# Patient Record
Sex: MALE | Race: ASIAN | NOT HISPANIC OR LATINO
[De-identification: names, ages, dates, MRNs, and addresses within clinical notes are randomized per-mention and may not be internally consistent; named-entity substitution may affect disease eponyms.]

---

## 2018-03-09 ENCOUNTER — NON-APPOINTMENT (OUTPATIENT)
Age: 63
End: 2018-03-09

## 2018-03-09 ENCOUNTER — APPOINTMENT (OUTPATIENT)
Dept: CARDIOLOGY | Facility: CLINIC | Age: 63
End: 2018-03-09
Payer: MEDICAID

## 2018-03-09 VITALS
DIASTOLIC BLOOD PRESSURE: 115 MMHG | TEMPERATURE: 97.9 F | RESPIRATION RATE: 18 BRPM | SYSTOLIC BLOOD PRESSURE: 154 MMHG | BODY MASS INDEX: 22.68 KG/M2 | HEART RATE: 79 BPM | WEIGHT: 128 LBS | OXYGEN SATURATION: 99 %

## 2018-03-09 PROCEDURE — 99214 OFFICE O/P EST MOD 30 MIN: CPT

## 2018-03-09 PROCEDURE — 93000 ELECTROCARDIOGRAM COMPLETE: CPT

## 2018-03-15 ENCOUNTER — APPOINTMENT (OUTPATIENT)
Dept: CARDIOLOGY | Facility: CLINIC | Age: 63
End: 2018-03-15
Payer: MEDICAID

## 2018-03-15 PROCEDURE — 93306 TTE W/DOPPLER COMPLETE: CPT

## 2018-05-20 RX ORDER — ATENOLOL 50 MG/1
50 TABLET ORAL
Refills: 0 | Status: ACTIVE | COMMUNITY
Start: 2017-10-24

## 2018-10-24 ENCOUNTER — APPOINTMENT (OUTPATIENT)
Dept: CARDIOLOGY | Facility: CLINIC | Age: 63
End: 2018-10-24
Payer: MEDICAID

## 2018-10-24 ENCOUNTER — NON-APPOINTMENT (OUTPATIENT)
Age: 63
End: 2018-10-24

## 2018-10-24 VITALS
RESPIRATION RATE: 18 BRPM | WEIGHT: 130 LBS | HEART RATE: 66 BPM | TEMPERATURE: 98.2 F | DIASTOLIC BLOOD PRESSURE: 90 MMHG | SYSTOLIC BLOOD PRESSURE: 144 MMHG | OXYGEN SATURATION: 99 % | BODY MASS INDEX: 23.04 KG/M2

## 2018-10-24 PROCEDURE — 99214 OFFICE O/P EST MOD 30 MIN: CPT

## 2018-10-24 RX ORDER — WARFARIN 4 MG/1
4 TABLET ORAL
Qty: 30 | Refills: 0 | Status: DISCONTINUED | COMMUNITY
Start: 2018-10-22 | End: 2018-10-24

## 2018-10-24 RX ORDER — LOSARTAN POTASSIUM 100 MG/1
100 TABLET, FILM COATED ORAL
Qty: 30 | Refills: 0 | Status: ACTIVE | COMMUNITY
Start: 2018-07-25

## 2018-10-29 RX ORDER — LOSARTAN POTASSIUM 25 MG/1
25 TABLET, FILM COATED ORAL
Qty: 30 | Refills: 0 | Status: DISCONTINUED | COMMUNITY
Start: 2017-10-24 | End: 2018-10-29

## 2019-02-22 ENCOUNTER — APPOINTMENT (OUTPATIENT)
Dept: CARDIOLOGY | Facility: CLINIC | Age: 64
End: 2019-02-22
Payer: MEDICAID

## 2019-02-22 VITALS
OXYGEN SATURATION: 97 % | DIASTOLIC BLOOD PRESSURE: 87 MMHG | BODY MASS INDEX: 22.86 KG/M2 | WEIGHT: 129 LBS | SYSTOLIC BLOOD PRESSURE: 130 MMHG | HEART RATE: 63 BPM | TEMPERATURE: 98 F | RESPIRATION RATE: 17 BRPM

## 2019-02-22 DIAGNOSIS — I74.2 EMBOLISM AND THROMBOSIS OF ARTERIES OF THE UPPER EXTREMITIES: ICD-10-CM

## 2019-02-22 PROCEDURE — 99214 OFFICE O/P EST MOD 30 MIN: CPT

## 2019-02-22 RX ORDER — RIVAROXABAN 20 MG/1
20 TABLET, FILM COATED ORAL DAILY
Qty: 30 | Refills: 5 | Status: ACTIVE | COMMUNITY
Start: 2018-10-24 | End: 1900-01-01

## 2019-02-22 NOTE — DISCUSSION/SUMMARY
[FreeTextEntry1] : 63-year-old male smoker with AFIB and HTN presents for followup.  Patient was last seen on 3/9/18.  Patient underwent an echocardiogram and it showed normal LV function without significant valvular pathology.  Patient reports that on 9/29/18 while in [place] he developed arm pain and discoloration.  He was found to have a clot requiring embolectomy.  Patient reports residual numbness along the ulnar nerve distribution.  He was advised to continue ASA.  His PCP started him on Warfarin.  Patient has been stable.  Patient denies CP, SOB, palpitations, or lightheadedness.  He is on Atenolol 50 mg, Diltiazem 120 mg, and Losartan 100 mg for rate control and HTN.\par \par (1) Atrial fibrillation, with emboli to arm - I advised patient to switch Warfarin to Xarelto 20 mg.  He may stop ASA.   His heart rate is well controlled.  I advised patient to continue Diltiazem and Atenolol.  \par \par (2) HTN - His BP was borderline elevated.  HCTZ can be added to Losartan 100 mg if his BP remains elevated.  \par \par (3) Followup - 1 month.

## 2019-02-22 NOTE — HISTORY OF PRESENT ILLNESS
[FreeTextEntry1] : Patient denies CP, SOB, palpitations, or lightheadedness. Patient had stopped taking Aspirin. Denies any bleeding issues. Continuing with Diltiazem and Atenolol. FU in 6 months and I advised patient to undergo an echocardiogram then. \par \par 63-year-old male smoker with AFIB, emboli to arm, and HTN presents for followup.  Patient was last seen on 10/24/18.   I advised patient to switch Warfarin to Xarelto 20 mg and stop ASA.   He is on Diltiazem  mg and Atenolol 50 mg for rate control.  He is on Losartan 100 mg for HTN.\par \par (1) Atrial fibrillation, with emboli to arm - I advised patient to switch Warfarin to Xarelto 20 mg.  He may stop ASA.   His heart rate is well controlled.  I advised patient to continue Diltiazem and Atenolol.  \par \par (2) HTN - His BP was borderline elevated.  HCTZ can be added to Losartan 100 mg if his BP remains elevated.  \par \par (3) Followup - 1 month.\par \par 3/9/18.  Patient underwent an echocardiogram and it showed normal LV function without significant valvular pathology.  Patient reports that on 9/29/18 while in [place] he developed arm pain and discoloration.  He was found to have a clot requiring embolectomy.  Patient reports residual numbness along the ulnar nerve distribution.  He was advised to continue ASA.  His PCP started him on Warfarin.  Patient has been stable.  Patient denies CP, SOB, palpitations, or lightheadedness.  He is on Atenolol 50 mg, Diltiazem 120 mg, and Losartan 100 mg for rate control and HTN.\par \par \par \par \par (1) Atrial fibrillation, persistent - Patient is asymptomatic.  Patient is not interested in cardioversion at this time (pharmacologic or electrical).  His [id number] score is 1 (HTN).  He should continue ASA 81 mg for stroke prevention.  He will switch to NOAC when he turns 67.  His heart rate is well controlled.  I advised patient to continue Diltiazem and Atenolol.  Patient underwent an echocardiogram and it showed normal LV function without significant valvular pathology. \par \par (2) HTN - His BP was elevated.  His PCP already increased Atenolol to 50 mg.  He should continue Losartan 25 mg.   Losartan can be increased to 50 mg if his BP remains elevated.\par \par (3) Followup - 6 months.\par \par 1/8/16 for evaluation of atrial fibrillation.  Patient underwent an echocardiogram and it showed normal LV function without significant valvular pathology.   He was advised to start Diltiazem  mg for rate control and ASA 81 mg for stroke prevention.  Since then he has been started on Atenolol 25 mg for additional rate control and Losartan 25 mg for HTN.  Patient has been stable.  Patient denies CP, SOB, palpitations, or lightheadedness.  His BP was elevated today so his PCP advised him to increase Atenolol to 50 mg.\par

## 2019-02-22 NOTE — PHYSICAL EXAM
[General Appearance - Well Developed] : well developed [Normal Appearance] : normal appearance [Well Groomed] : well groomed [General Appearance - Well Nourished] : well nourished [No Deformities] : no deformities [General Appearance - In No Acute Distress] : no acute distress [Normal Conjunctiva] : the conjunctiva exhibited no abnormalities [Eyelids - No Xanthelasma] : the eyelids demonstrated no xanthelasmas [Normal Oral Mucosa] : normal oral mucosa [No Oral Pallor] : no oral pallor [No Oral Cyanosis] : no oral cyanosis [Normal Jugular Venous A Waves Present] : normal jugular venous A waves present [Normal Jugular Venous V Waves Present] : normal jugular venous V waves present [No Jugular Venous Yen A Waves] : no jugular venous yen A waves [Respiration, Rhythm And Depth] : normal respiratory rhythm and effort [Exaggerated Use Of Accessory Muscles For Inspiration] : no accessory muscle use [Heart Sounds] : normal S1 and S2 [Murmurs] : no murmurs present [Arterial Pulses Normal] : the arterial pulses were normal [Edema] : no peripheral edema present [Irregularly Irregular] : the rhythm was irregularly irregular [Abdomen Soft] : soft [Abdomen Tenderness] : non-tender [Abdomen Mass (___ Cm)] : no abdominal mass palpated [Abnormal Walk] : normal gait [Gait - Sufficient For Exercise Testing] : the gait was sufficient for exercise testing [Nail Clubbing] : no clubbing of the fingernails [Cyanosis, Localized] : no localized cyanosis [Petechial Hemorrhages (___cm)] : no petechial hemorrhages [] : no ischemic changes [Oriented To Time, Place, And Person] : oriented to person, place, and time [Affect] : the affect was normal [Mood] : the mood was normal [No Anxiety] : not feeling anxious [FreeTextEntry1] : wheezing noted on left

## 2021-04-16 ENCOUNTER — NON-APPOINTMENT (OUTPATIENT)
Age: 66
End: 2021-04-16

## 2021-04-16 ENCOUNTER — APPOINTMENT (OUTPATIENT)
Dept: CARDIOLOGY | Facility: CLINIC | Age: 66
End: 2021-04-16
Payer: MEDICAID

## 2021-04-16 VITALS
SYSTOLIC BLOOD PRESSURE: 130 MMHG | HEART RATE: 81 BPM | TEMPERATURE: 96 F | DIASTOLIC BLOOD PRESSURE: 82 MMHG | BODY MASS INDEX: 21.8 KG/M2 | OXYGEN SATURATION: 97 % | RESPIRATION RATE: 18 BRPM | WEIGHT: 123 LBS

## 2021-04-16 PROCEDURE — 93000 ELECTROCARDIOGRAM COMPLETE: CPT

## 2021-04-16 PROCEDURE — 99214 OFFICE O/P EST MOD 30 MIN: CPT | Mod: 25

## 2021-04-16 PROCEDURE — 99072 ADDL SUPL MATRL&STAF TM PHE: CPT

## 2021-04-16 PROCEDURE — 93306 TTE W/DOPPLER COMPLETE: CPT

## 2021-07-20 NOTE — HISTORY OF PRESENT ILLNESS
[FreeTextEntry1] : 66-year-old male smoker with AFIB, emboli to arm, and HTN presents for followup.  \par \par Patient was last seen on 10/24/18.   I advised patient to switch Warfarin to Xarelto 20 mg and stop ASA.   He is on Diltiazem  mg and Atenolol 50 mg for rate control.  He is on Losartan 100 mg for HTN.\par \par Last echo was on 3/15/18 and it showed normal LV function without significant valvular pathology.

## 2021-07-20 NOTE — REASON FOR VISIT
[Follow-Up - Clinic] : a clinic follow-up of [Atrial Fibrillation] : atrial fibrillation [FreeTextEntry1] : 4/16/21 - Patient denies CP, SOB, palpitations, or lightheadedness. He is feeling well. I advised patient to undergo an echocardiogram. Fu in one year. \par \par 2/22/19 - Patient denies CP, SOB, palpitations, or lightheadedness. Patient had stopped taking Aspirin. Denies any bleeding issues. Continuing with Diltiazem and Atenolol. FU in 6 months and I advised patient to undergo an echocardiogram then.

## 2022-07-20 ENCOUNTER — APPOINTMENT (OUTPATIENT)
Dept: CARDIOLOGY | Facility: CLINIC | Age: 67
End: 2022-07-20

## 2022-07-20 ENCOUNTER — NON-APPOINTMENT (OUTPATIENT)
Age: 67
End: 2022-07-20

## 2022-07-20 VITALS
HEART RATE: 106 BPM | RESPIRATION RATE: 17 BRPM | SYSTOLIC BLOOD PRESSURE: 145 MMHG | OXYGEN SATURATION: 98 % | DIASTOLIC BLOOD PRESSURE: 97 MMHG | BODY MASS INDEX: 24.46 KG/M2 | TEMPERATURE: 97.3 F | WEIGHT: 138 LBS

## 2022-07-20 DIAGNOSIS — I10 ESSENTIAL (PRIMARY) HYPERTENSION: ICD-10-CM

## 2022-07-20 DIAGNOSIS — R00.2 PALPITATIONS: ICD-10-CM

## 2022-07-20 DIAGNOSIS — I48.91 UNSPECIFIED ATRIAL FIBRILLATION: ICD-10-CM

## 2022-07-20 PROCEDURE — 93000 ELECTROCARDIOGRAM COMPLETE: CPT

## 2022-07-20 PROCEDURE — 93306 TTE W/DOPPLER COMPLETE: CPT

## 2022-07-20 PROCEDURE — 99214 OFFICE O/P EST MOD 30 MIN: CPT | Mod: 25

## 2022-07-20 RX ORDER — DILTIAZEM HYDROCHLORIDE 120 MG/1
120 CAPSULE, EXTENDED RELEASE ORAL
Qty: 30 | Refills: 5 | Status: DISCONTINUED | COMMUNITY
Start: 2018-05-26 | End: 2022-07-20

## 2022-07-20 RX ORDER — ATENOLOL 25 MG/1
25 TABLET ORAL AT BEDTIME
Qty: 30 | Refills: 5 | Status: ACTIVE | COMMUNITY
Start: 2022-07-20 | End: 1900-01-01

## 2022-07-30 PROBLEM — R00.2 PALPITATIONS: Status: ACTIVE | Noted: 2022-07-30

## 2022-07-30 PROBLEM — I10 HTN (HYPERTENSION): Status: ACTIVE | Noted: 2018-03-09

## 2025-01-27 ENCOUNTER — NON-APPOINTMENT (OUTPATIENT)
Age: 70
End: 2025-01-27

## 2025-01-27 ENCOUNTER — APPOINTMENT (OUTPATIENT)
Dept: CARDIOLOGY | Facility: CLINIC | Age: 70
End: 2025-01-27
Payer: MEDICARE

## 2025-01-27 VITALS
OXYGEN SATURATION: 99 % | RESPIRATION RATE: 18 BRPM | SYSTOLIC BLOOD PRESSURE: 133 MMHG | HEART RATE: 91 BPM | DIASTOLIC BLOOD PRESSURE: 84 MMHG | WEIGHT: 138 LBS | BODY MASS INDEX: 24.46 KG/M2

## 2025-01-27 DIAGNOSIS — I48.91 UNSPECIFIED ATRIAL FIBRILLATION: ICD-10-CM

## 2025-01-27 DIAGNOSIS — I10 ESSENTIAL (PRIMARY) HYPERTENSION: ICD-10-CM

## 2025-01-27 DIAGNOSIS — I74.2 EMBOLISM AND THROMBOSIS OF ARTERIES OF THE UPPER EXTREMITIES: ICD-10-CM

## 2025-01-27 PROCEDURE — 93306 TTE W/DOPPLER COMPLETE: CPT

## 2025-01-27 PROCEDURE — 99214 OFFICE O/P EST MOD 30 MIN: CPT | Mod: 25

## 2025-01-27 PROCEDURE — 93000 ELECTROCARDIOGRAM COMPLETE: CPT
